# Patient Record
Sex: MALE | Race: OTHER | NOT HISPANIC OR LATINO | URBAN - METROPOLITAN AREA
[De-identification: names, ages, dates, MRNs, and addresses within clinical notes are randomized per-mention and may not be internally consistent; named-entity substitution may affect disease eponyms.]

---

## 2020-12-18 ENCOUNTER — EMERGENCY (EMERGENCY)
Facility: HOSPITAL | Age: 70
LOS: 1 days | Discharge: HOME | End: 2020-12-18
Attending: PERSONAL EMERGENCY RESPONSE ATTENDANT | Admitting: PERSONAL EMERGENCY RESPONSE ATTENDANT
Payer: MEDICAID

## 2020-12-18 VITALS
TEMPERATURE: 99 F | HEART RATE: 84 BPM | RESPIRATION RATE: 18 BRPM | DIASTOLIC BLOOD PRESSURE: 70 MMHG | OXYGEN SATURATION: 99 % | SYSTOLIC BLOOD PRESSURE: 122 MMHG

## 2020-12-18 PROCEDURE — 99284 EMERGENCY DEPT VISIT MOD MDM: CPT

## 2020-12-18 NOTE — ED ADULT NURSE NOTE - OBJECTIVE STATEMENT
A&Ox4 and ambulatory. Pt. states that he had been previously taking Gemfibrozil for 1 year, stopped for 3 months and had just recently began taking it again. He states that over the past few days after taking the medication he's been having generalized itching. Pt. reports today having facial swelling that started in the left cheek and spread to lips. Lips and left cheek are swollen. Pt. able to talk, no drooling or respiratory distress noted. Pt. reports taking 2 Benadryl around 7pm.

## 2020-12-18 NOTE — ED ADULT TRIAGE NOTE - CHIEF COMPLAINT QUOTE
Pt c/o allergic reaction started at 6pm after taking his medication Gemfibrozil. Pt presents with severe lip swelling and left sided facial swelling. Denies difficulty swallowing. No stridor/drooling noted. Pt states he took 2 tabs of benadryl at 730pm with no relief.  Camron (daughter) contact info, [phone]

## 2020-12-18 NOTE — ED ADULT NURSE NOTE - CHIEF COMPLAINT QUOTE
Pt c/o allergic reaction started at 6pm after taking his medication Gemfibrozil. Pt presents with severe lip swelling and left sided facial swelling. Denies difficulty swallowing. No stridor/drooling noted. Pt states he took 2 tabs of benadryl at 730pm with no relief.  Camron (daughter) contact info, [phone]

## 2020-12-19 VITALS
OXYGEN SATURATION: 98 % | HEART RATE: 78 BPM | SYSTOLIC BLOOD PRESSURE: 118 MMHG | RESPIRATION RATE: 18 BRPM | DIASTOLIC BLOOD PRESSURE: 60 MMHG

## 2020-12-19 RX ORDER — FAMOTIDINE 10 MG/ML
20 INJECTION INTRAVENOUS ONCE
Refills: 0 | Status: COMPLETED | OUTPATIENT
Start: 2020-12-19 | End: 2020-12-19

## 2020-12-19 RX ORDER — DEXAMETHASONE 0.5 MG/5ML
10 ELIXIR ORAL ONCE
Refills: 0 | Status: COMPLETED | OUTPATIENT
Start: 2020-12-19 | End: 2020-12-19

## 2020-12-19 RX ORDER — DIPHENHYDRAMINE HCL 50 MG
50 CAPSULE ORAL ONCE
Refills: 0 | Status: COMPLETED | OUTPATIENT
Start: 2020-12-19 | End: 2020-12-19

## 2020-12-19 RX ADMIN — FAMOTIDINE 20 MILLIGRAM(S): 10 INJECTION INTRAVENOUS at 00:25

## 2020-12-19 RX ADMIN — Medication 50 MILLIGRAM(S): at 00:25

## 2020-12-19 RX ADMIN — Medication 102 MILLIGRAM(S): at 00:25

## 2020-12-19 NOTE — ED PROVIDER NOTE - PROGRESS NOTE DETAILS
Patient reevaluated, angioedema improved, instructed to stop taking ramipril and gemfibrozil and follow up with his PCP this week. Rodney No DO PGY2 Attending MD Huynh.  Pt extensively educated re: angioedema concerns as well as need to discontinue his gemfibrozil only 2/2 recent recommencement and development of this sx as well as ramipril with inc likelihood of contribution to this presentation by ACE inhibitor despite having been on it without issue for many years.  Pt initially stated that he would 'stop all my meds' and follow-up with PCP.  Pt has been counseled to continue his usual medicine regimen except for these 2 medications given higher likelihood of their contribution and desire not to case pt's BP and other baseline medical problems to become a problem in the interim.  Pt aware that he needs to follow-up with PCP in 2-3 days for reassessment.  Return to ED for any new/worsening sxs inc inc swelling, development of difficulty swallowing or breathing.  Angioedema has reduced in ED and pt has not had any involvement of uvula/throat/tongue during this event from start to finish.  Pt comfortable with and verbalizes understanding of above at time of d/c.

## 2020-12-19 NOTE — ED PROVIDER NOTE - NSFOLLOWUPINSTRUCTIONS_ED_ALL_ED_FT
You were seen in the ED for angioedema. Please follow up with your primary care provider this week. Stop taking ramipril and gemfibrozil as these could be causes of your symptoms.    Return for worsening symptoms, difficulty breathing, vomiting, or other concerning symptoms.

## 2020-12-19 NOTE — ED PROVIDER NOTE - CLINICAL SUMMARY MEDICAL DECISION MAKING FREE TEXT BOX
70 y/o male with hx of HTN, HLD, DM, CAD presenting to the ED with possible allergic reaction to gemfibrozil. Vitals stable upon arrival. Exam with prominent swelling to left sided face and lips. No airway involvement. Lungs CTA. Plan to treat with benadryl, pepcid, decadron, reassess. Rodney No, DO PGY2

## 2020-12-19 NOTE — ED PROVIDER NOTE - PATIENT PORTAL LINK FT
You can access the FollowMyHealth Patient Portal offered by [facility] by registering at the following website: [email or web address]. By joining [facility]’s FollowMyHealth portal, you will also be able to view your health information using other applications (apps) compatible with our system.

## 2020-12-19 NOTE — ED PROVIDER NOTE - PHYSICAL EXAMINATION
GENERAL: Awake, alert, NAD  HEENT: NC/AT, moist mucous membranes, prominent swelling to left cheek, left upper lip, left lower, uvula midline without swelling, no tongue swelling  LUNGS: CTAB, no wheezes or crackles   CARDIAC: RRR, no m/r/g  ABDOMEN: Soft, normal BS, non tender, non distended, no rebound, no guarding  BACK: No midline spinal tenderness, no CVA tenderness  EXT: No edema, no calf tenderness, 2+ DP pulses bilaterally, no deformities.  NEURO: A&Ox3. Moving all extremities.  SKIN: Warm and dry. No rash.  PSYCH: Normal affect.

## 2020-12-19 NOTE — ED PROVIDER NOTE - OBJECTIVE STATEMENT
70 y/o male with hx of HTN, DM, HLD, CAD s/p stent presents to the ED c/o facial swelling onset around 7pm tonight. He reports recently started gemfibrozil this week 70 y/o male with hx of HTN, DM, HLD, CAD s/p stent presents to the ED c/o facial swelling onset around 7pm tonight. He reports recently started gemfibrozil this week and each day has noticed a different reaction, mostly just itching of his skin. Today he took the gemfibrozil around 6:30pm and then around 7:00pm noticed swelling of his left cheek that spread to his left upper and lower lip. No airway involvement. No nausea, vomiting, diarrhea, abdominal pain, CP, SOB, fever.

## 2020-12-19 NOTE — ED PROVIDER NOTE - ATTENDING CONTRIBUTION TO CARE
Attending MD Huynh.  Agree with above.  Rock is a 68 yo male with hx of HTN, HLD, DM, CAD who began to take his gemfibrozile again this week after he’d discontinued x 6 mos.  Tonight took med at 1830 and then at 1900 developed swelling to L cheek which then spread to involve lips.  No resp distress, n/v/abdominal pain.  Pt took 50 mg Benadryl shortly after sxs began.  No known exposures to any known allergens.  Hasn’t changed his diet.  Rock takes ramapril and has been on it for 15 yrs and has never had a reaction.  On exam pt has swelling to upper and lower lips and L cheek.  Uvular midline.  No throat involvement.  Lungs clear.

## 2023-09-09 NOTE — ED ADULT TRIAGE NOTE - TEMPERATURE IN FAHRENHEIT (DEGREES F)
64y Male with lung cancer and brain metastases.     Brain metastases.  Continue decadron for edema  Continue Keppra for seziure prevention  Overall plan per oncology to address metastaic cancer    Lung cancer   with brain mets as above  Plan per oncology.    Altered mental status.  Now more lethargic than previously noted.   LP done, CSF with no WBC, slightly elevated protein and glucose neg PCR meningitis/encephalitis panel    He is followed by oncology and by palliative care and hospice care was discussed with the family.    will follow with you    Kostas Carrasco MD PhD   [id number]   98.6